# Patient Record
Sex: FEMALE | Race: BLACK OR AFRICAN AMERICAN | NOT HISPANIC OR LATINO | Employment: FULL TIME | URBAN - METROPOLITAN AREA
[De-identification: names, ages, dates, MRNs, and addresses within clinical notes are randomized per-mention and may not be internally consistent; named-entity substitution may affect disease eponyms.]

---

## 2022-11-29 ENCOUNTER — OFFICE VISIT (OUTPATIENT)
Dept: FAMILY MEDICINE CLINIC | Facility: CLINIC | Age: 22
End: 2022-11-29

## 2022-11-29 VITALS
BODY MASS INDEX: 28.7 KG/M2 | HEART RATE: 75 BPM | WEIGHT: 162 LBS | HEIGHT: 63 IN | DIASTOLIC BLOOD PRESSURE: 88 MMHG | OXYGEN SATURATION: 100 % | TEMPERATURE: 98.2 F | SYSTOLIC BLOOD PRESSURE: 125 MMHG

## 2022-11-29 DIAGNOSIS — Z30.41 ENCOUNTER FOR BIRTH CONTROL PILLS MAINTENANCE: ICD-10-CM

## 2022-11-29 DIAGNOSIS — J45.20 MILD INTERMITTENT ASTHMA WITHOUT COMPLICATION: ICD-10-CM

## 2022-11-29 DIAGNOSIS — F41.9 ANXIETY: Primary | ICD-10-CM

## 2022-11-29 PROCEDURE — 99204 OFFICE O/P NEW MOD 45 MIN: CPT

## 2022-11-29 RX ORDER — BUSPIRONE HYDROCHLORIDE 7.5 MG/1
7.5 TABLET ORAL 3 TIMES DAILY
Qty: 90 TABLET | Refills: 5 | Status: SHIPPED | OUTPATIENT
Start: 2022-11-29 | End: 2023-01-10 | Stop reason: ALTCHOICE

## 2022-11-29 RX ORDER — NORGESTIMATE AND ETHINYL ESTRADIOL 7DAYSX3 28
1 KIT ORAL DAILY
Qty: 28 TABLET | Refills: 12 | Status: CANCELLED | OUTPATIENT
Start: 2022-11-29 | End: 2023-11-29

## 2022-11-29 RX ORDER — LEVONORGESTREL AND ETHINYL ESTRADIOL 0.1-0.02MG
1 KIT ORAL DAILY
Qty: 28 TABLET | Refills: 12 | Status: SHIPPED | OUTPATIENT
Start: 2022-11-29 | End: 2023-11-29

## 2022-11-29 NOTE — PROGRESS NOTES
Chief Complaint  Contraception, Asthma, and Anxiety    Subjective          History of Present Illness    Nazia Moore 22 y.o. female who presents today for a new patient appointment.  She has a history of:   Patient Active Problem List   Diagnosis   • Anxiety   • Asthma   She is here to establish care and is a new patient to me I reviewed the PFSH recorded today by my MA/LPN staff.   She has been feeling well.     She has a history of asthma that is well controlled.  She uses her Albuterol inhaler as needed.  No current or recent exacerbations.  She is not symptomatic.    She has anxiety without depression.  She has never taken anxiety medication, but she wants to start medication today.  She does not see a therapist.  Evaluation to date: none.  Treatment to date: none.  She denies depression, suicidal thoughts, suicidal plan, and self-harm.    The patient takes levonorgestrel-ethinyl estradiol (Vienva) 0.1-20 MG-MCG daily for birth control.  The patient tolerates the medication well without side effects.  She is requesting a refill today.  She is not a smoker.  No history of DVT, and no signs/symptoms of DVT.  Medication reviewed.  Will refill medication.    She  denies medication side effects.    Review of Systems   Constitutional: Negative for chills, fatigue and fever.   HENT: Negative for congestion and sinus pressure.    Eyes: Negative for visual disturbance.   Respiratory: Negative for apnea, cough, chest tightness, shortness of breath and wheezing.    Cardiovascular: Negative for chest pain and palpitations.   Gastrointestinal: Negative for abdominal pain, constipation, diarrhea, nausea and vomiting.   Genitourinary: Negative for dysuria, frequency and urgency.   Musculoskeletal: Negative for back pain.   Skin: Negative for rash.   Neurological: Negative for dizziness, syncope and light-headedness.   Psychiatric/Behavioral: Negative for agitation, behavioral problems, decreased concentration, dysphoric  "mood, self-injury, sleep disturbance and suicidal ideas. The patient is nervous/anxious. The patient is not hyperactive.         Objective   Vital Signs:   /88   Pulse 75   Temp 98.2 °F (36.8 °C)   Ht 160 cm (63\")   Wt 73.5 kg (162 lb)   SpO2 100%   BMI 28.70 kg/m²      BMI is >= 25 and <30. (Overweight) The following options were offered after discussion;: exercise counseling/recommendations and nutrition counseling/recommendations        Physical Exam  Vitals and nursing note reviewed.   Constitutional:       Appearance: Normal appearance. She is well-developed and overweight. She is not diaphoretic.   HENT:      Head: Normocephalic and atraumatic.      Right Ear: External ear normal.      Left Ear: External ear normal.   Eyes:      Conjunctiva/sclera: Conjunctivae normal.      Pupils: Pupils are equal, round, and reactive to light.   Neck:      Vascular: No carotid bruit.   Cardiovascular:      Rate and Rhythm: Normal rate and regular rhythm.      Pulses: Normal pulses.      Heart sounds: Normal heart sounds. No murmur heard.    No gallop.   Pulmonary:      Effort: Pulmonary effort is normal. No respiratory distress.   Musculoskeletal:         General: Normal range of motion.      Cervical back: Normal range of motion and neck supple.      Right lower leg: No edema.      Left lower leg: No edema.   Skin:     General: Skin is warm and dry.      Coloration: Skin is not jaundiced.   Neurological:      General: No focal deficit present.      Mental Status: She is alert and oriented to person, place, and time.      Motor: No weakness or abnormal muscle tone.      Coordination: Coordination normal.      Gait: Gait normal.   Psychiatric:         Mood and Affect: Mood is not anxious or depressed. Affect is not tearful.         Speech: Speech normal.         Behavior: Behavior normal. Behavior is cooperative.         Thought Content: Thought content normal. Thought content does not include suicidal ideation. " Thought content does not include suicidal plan.         Cognition and Memory: Cognition normal.         Judgment: Judgment normal.                         Assessment and Plan      Diagnoses and all orders for this visit:    1. Anxiety (Primary)  Comments:  New patient  Start Buspirone as directed  Work on relaxation techniques, consider therapy  Follow-up in 6 weeks  Orders:  -     busPIRone (BUSPAR) 7.5 MG tablet; Take 1 tablet by mouth 3 (Three) Times a Day.  Dispense: 90 tablet; Refill: 5  -     Comprehensive metabolic panel  -     Lipid panel  -     CBC and Differential  -     TSH    2. Encounter for birth control pills maintenance  Comments:  Continue oral contraceptive pills as directed  Non-smoker, no DVT history  Follow-up in 6 weeks  Orders:  -     levonorgestrel-ethinyl estradiol (Vienva) 0.1-20 MG-MCG per tablet; Take 1 tablet by mouth Daily.  Dispense: 28 tablet; Refill: 12    3. Mild intermittent asthma without complication  Comments:  Well-controlled on Albuterol inhaler as needed  Asymptomatic   Continue Albuterol inhaler as needed  No refills needed              Follow Up     Return in about 6 weeks (around 1/10/2023) for Next scheduled follow up.    Patient was given instructions and counseling regarding her condition or for health maintenance advice. Please see specific information pulled into the AVS if appropriate.     -Follow-up in 6 weeks.

## 2022-12-15 ENCOUNTER — OFFICE VISIT (OUTPATIENT)
Dept: FAMILY MEDICINE CLINIC | Facility: CLINIC | Age: 22
End: 2022-12-15

## 2022-12-15 VITALS
BODY MASS INDEX: 31.01 KG/M2 | HEIGHT: 63 IN | DIASTOLIC BLOOD PRESSURE: 72 MMHG | OXYGEN SATURATION: 99 % | WEIGHT: 175 LBS | SYSTOLIC BLOOD PRESSURE: 117 MMHG | HEART RATE: 77 BPM | RESPIRATION RATE: 16 BRPM | TEMPERATURE: 98.2 F

## 2022-12-15 DIAGNOSIS — K90.49 FOOD INTOLERANCE: ICD-10-CM

## 2022-12-15 DIAGNOSIS — R11.10 VOMITING AND DIARRHEA: Primary | ICD-10-CM

## 2022-12-15 DIAGNOSIS — R19.7 VOMITING AND DIARRHEA: Primary | ICD-10-CM

## 2022-12-15 PROCEDURE — 99213 OFFICE O/P EST LOW 20 MIN: CPT

## 2022-12-15 RX ORDER — ALBUTEROL SULFATE 90 UG/1
AEROSOL, METERED RESPIRATORY (INHALATION)
COMMUNITY
Start: 2022-12-14

## 2022-12-15 NOTE — PROGRESS NOTES
"Chief Complaint  Diarrhea and Vomiting (Patient states that symptoms started 12/14/2022)    Subjective          History of Present Illness    Nazia Moore 22 y.o. female presents reporting vomiting and diarrhea.  Symptom onset was yesterday.  Symptoms are related to eating peanut butter and sushi.  She ate peanut butter last night and sushi yesterday, and symptoms started after eating the sushi.  She had problems eating sushi and peanut butter in the past.  She had 4 episodes of diarrhea in the past 24 hours.  First day of LMP was 11/24/2022. Evaluation to date: none.  Treatment to date: ginger ale.  She reports possible pregnancy.  She reports she is starting to feel better.  She denies abdominal pain, fever, chills, rectal pain, rectal pressure, nausea, blood in stool, dizziness, lightheadedness, and weakness.    Review of Systems   Constitutional: Negative for chills, fatigue and fever.   HENT: Negative for congestion and sinus pressure.    Eyes: Negative for visual disturbance.   Respiratory: Negative for cough, chest tightness and shortness of breath.    Cardiovascular: Negative for chest pain and palpitations.   Gastrointestinal: Positive for diarrhea and vomiting. Negative for abdominal distention, abdominal pain, anal bleeding, blood in stool, constipation, nausea and rectal pain.   Genitourinary: Negative for dysuria, frequency and urgency.   Musculoskeletal: Negative for back pain.   Skin: Negative for rash.   Neurological: Negative for dizziness, syncope, weakness and light-headedness.   Psychiatric/Behavioral: Negative for behavioral problems and sleep disturbance.        Objective   Vital Signs:   /72   Pulse 77   Temp 98.2 °F (36.8 °C) (Oral)   Resp 16   Ht 160 cm (63\")   Wt 79.4 kg (175 lb)   SpO2 99%   BMI 31.00 kg/m²        Physical Exam  Vitals and nursing note reviewed.   Constitutional:       General: She is not in acute distress.     Appearance: Normal appearance. She is " well-developed. She is not diaphoretic.   HENT:      Head: Normocephalic and atraumatic.   Eyes:      General: No scleral icterus.     Conjunctiva/sclera: Conjunctivae normal.      Pupils: Pupils are equal, round, and reactive to light.   Cardiovascular:      Rate and Rhythm: Normal rate and regular rhythm.      Pulses: Normal pulses.      Heart sounds: Normal heart sounds. No murmur heard.    No gallop.   Pulmonary:      Effort: Pulmonary effort is normal. No respiratory distress.      Breath sounds: No wheezing or rales.   Chest:      Chest wall: No tenderness.   Abdominal:      General: Bowel sounds are normal. There is no distension or abdominal bruit.      Palpations: Abdomen is soft. Abdomen is not rigid. There is no hepatomegaly, splenomegaly or mass.      Tenderness: There is no abdominal tenderness. There is no guarding or rebound. Negative signs include Snell's sign and McBurney's sign.      Comments: No abdominal tenderness with palpation.  Abdomen palpates soft.  Bowel sounds are active in all four quadrants.  No guarding or rebound tenderness.   Musculoskeletal:         General: No deformity. Normal range of motion.      Cervical back: Normal range of motion and neck supple.   Skin:     General: Skin is warm and dry.      Findings: No rash.   Neurological:      Mental Status: She is alert and oriented to person, place, and time.   Psychiatric:         Behavior: Behavior normal.         Thought Content: Thought content normal.         Judgment: Judgment normal.                         Assessment and Plan      Diagnoses and all orders for this visit:    1. Vomiting and diarrhea (Primary)  Comments:  Improving  Avoid peanut butter and sushi  Clear liquids, hydrate well  Return if no improvement    2. Food intolerance  Comments:  Improving  Avoid peanut butter and sushi  Clear liquids, hydrate well  Return if no improvement            Follow Up     Return if symptoms worsen or fail to improve.    Patient  was given instructions and counseling regarding her condition or for health maintenance advice. Please see specific information pulled into the AVS if appropriate.     -Clear liquid diet and bowel rest for 24 hours, then bland diet for 24 hours, then slowly introduce foods back into diet.  -Start taking a probiotic daily.  -Drink plenty of fluids and get plenty of rest.  -Report to the ED for abdominal pain, fever, chills, vomiting, stools that are bloody/black/tarry, inability to have a bowel movement, or any other worsening signs or symptoms.  -Return if symptoms worsen or do not improve.

## 2023-01-10 ENCOUNTER — OFFICE VISIT (OUTPATIENT)
Dept: FAMILY MEDICINE CLINIC | Facility: CLINIC | Age: 23
End: 2023-01-10
Payer: COMMERCIAL

## 2023-01-10 VITALS
WEIGHT: 180 LBS | OXYGEN SATURATION: 98 % | DIASTOLIC BLOOD PRESSURE: 88 MMHG | HEART RATE: 86 BPM | BODY MASS INDEX: 31.89 KG/M2 | TEMPERATURE: 98.2 F | HEIGHT: 63 IN | SYSTOLIC BLOOD PRESSURE: 122 MMHG

## 2023-01-10 DIAGNOSIS — R45.86 MOOD CHANGES: ICD-10-CM

## 2023-01-10 DIAGNOSIS — F41.9 ANXIETY: Primary | ICD-10-CM

## 2023-01-10 DIAGNOSIS — F32.A DEPRESSION, UNSPECIFIED DEPRESSION TYPE: ICD-10-CM

## 2023-01-10 PROCEDURE — 99214 OFFICE O/P EST MOD 30 MIN: CPT

## 2023-01-10 RX ORDER — BUSPIRONE HYDROCHLORIDE 7.5 MG/1
7.5 TABLET ORAL 3 TIMES DAILY
Qty: 90 TABLET | Refills: 5 | Status: CANCELLED | OUTPATIENT
Start: 2023-01-10

## 2023-01-10 NOTE — PROGRESS NOTES
Chief Complaint  Anxiety    Subjective          History of Present Illness    Nazia Moore 22 y.o. female presents for a 6-week follow-up on anxiety.  The patient was last seen on 11/29/2022.  Buspirone 7.5 mg three times daily was started at that appointment.  The patient was encouraged to take medication as directed, work on relaxation techniques, consider therapy, and return to the office in 6 weeks for close follow-up.    Today, the patient reports anxiety has improved since starting Buspirone, but she is still having some symptoms.  She also reports depressed feelings, mood fluctuations, and \"highs and lows.\"  Symptoms onset was two months ago.  She denied depression at her last appointment.  She is not currently in therapy.  She reports a possible family history of bipolar with her father, but he has never been diagnosed.  She denies suicidal thoughts, suicidal plan, and self-harm.    PHQ-9 score: 7    Review of Systems   Constitutional: Negative for chills, fatigue and fever.   HENT: Negative for congestion and sinus pressure.    Eyes: Negative for visual disturbance.   Respiratory: Negative for apnea, cough, chest tightness, shortness of breath and wheezing.    Cardiovascular: Negative for chest pain and palpitations.   Gastrointestinal: Negative for abdominal pain, constipation, diarrhea, nausea and vomiting.   Genitourinary: Negative for dysuria, frequency and urgency.   Musculoskeletal: Negative for back pain.   Skin: Negative for rash.   Neurological: Negative for dizziness, syncope and light-headedness.   Psychiatric/Behavioral: Positive for dysphoric mood (highs and lows). Negative for agitation, behavioral problems, decreased concentration, self-injury, sleep disturbance and suicidal ideas. The patient is nervous/anxious. The patient is not hyperactive.         Objective   Vital Signs:   /88   Pulse 86   Temp 98.2 °F (36.8 °C)   Ht 160 cm (62.99\")   Wt 81.6 kg (180 lb)   SpO2 98%   BMI  31.89 kg/m²      BMI is >= 30 and <35. (Class 1 Obesity). The following options were offered after discussion;: exercise counseling/recommendations and nutrition counseling/recommendations        Physical Exam  Vitals and nursing note reviewed.   Constitutional:       Appearance: Normal appearance. She is well-developed. She is obese. She is not diaphoretic.   HENT:      Head: Normocephalic and atraumatic.      Right Ear: External ear normal.      Left Ear: External ear normal.   Eyes:      Conjunctiva/sclera: Conjunctivae normal.      Pupils: Pupils are equal, round, and reactive to light.   Neck:      Vascular: No carotid bruit.   Cardiovascular:      Rate and Rhythm: Normal rate and regular rhythm.      Pulses: Normal pulses.      Heart sounds: Normal heart sounds. No murmur heard.    No gallop.   Pulmonary:      Effort: Pulmonary effort is normal. No respiratory distress.   Musculoskeletal:         General: Normal range of motion.      Cervical back: Normal range of motion and neck supple.      Right lower leg: No edema.      Left lower leg: No edema.   Skin:     General: Skin is warm and dry.      Coloration: Skin is not jaundiced.   Neurological:      General: No focal deficit present.      Mental Status: She is alert and oriented to person, place, and time.      Motor: No weakness or abnormal muscle tone.      Coordination: Coordination normal.      Gait: Gait normal.   Psychiatric:         Mood and Affect: Mood is not anxious or depressed. Affect is not tearful.         Speech: Speech normal.         Behavior: Behavior normal. Behavior is cooperative.         Thought Content: Thought content normal. Thought content does not include suicidal ideation. Thought content does not include suicidal plan.         Cognition and Memory: Cognition normal.         Judgment: Judgment normal.                         Assessment and Plan      Diagnoses and all orders for this visit:    1. Anxiety  (Primary)  Comments:  Improved but still having symptoms  Start Sertraline daily  Make appointment for therapy, referral to Psychiatry  Follow-up in 6 weeks  Orders:  -     sertraline (Zoloft) 50 MG tablet; Take 1 tablet by mouth Daily.  Dispense: 30 tablet; Refill: 1  -     Ambulatory Referral to Psychiatry    2. Depression, unspecified depression type  Comments:  New problem  Start Sertraline daily  Make appointment for therapy, referral to Psychiatry  Follow-up in 6 weeks  Orders:  -     sertraline (Zoloft) 50 MG tablet; Take 1 tablet by mouth Daily.  Dispense: 30 tablet; Refill: 1  -     Ambulatory Referral to Psychiatry    3. Mood changes  Comments:  New problem  Start Sertraline daily  Make appointment for therapy, referral to Psychiatry  Follow-up in 6 weeks  Orders:  -     sertraline (Zoloft) 50 MG tablet; Take 1 tablet by mouth Daily.  Dispense: 30 tablet; Refill: 1  -     Ambulatory Referral to Psychiatry            Follow Up     Return in about 6 weeks (around 2/21/2023) for Next scheduled follow up.    Patient was given instructions and counseling regarding her condition or for health maintenance advice. Please see specific information pulled into the AVS if appropriate.     -Bonds for safety were provided today. The patient was encouraged to seek immediate mental health evaluation at [facility] emergency psychiatric services for worsening depression, suicidal thoughts, suicidal plan, or thoughts of self-harm.   -The patient was given a list of local therapists today.   -Close follow-up in 6 weeks.

## 2023-03-01 ENCOUNTER — OFFICE VISIT (OUTPATIENT)
Dept: FAMILY MEDICINE CLINIC | Facility: CLINIC | Age: 23
End: 2023-03-01
Payer: COMMERCIAL

## 2023-03-01 VITALS
HEART RATE: 67 BPM | RESPIRATION RATE: 16 BRPM | SYSTOLIC BLOOD PRESSURE: 110 MMHG | OXYGEN SATURATION: 99 % | WEIGHT: 173.8 LBS | TEMPERATURE: 97.5 F | BODY MASS INDEX: 30.79 KG/M2 | HEIGHT: 63 IN | DIASTOLIC BLOOD PRESSURE: 60 MMHG

## 2023-03-01 DIAGNOSIS — F41.9 ANXIETY: ICD-10-CM

## 2023-03-01 DIAGNOSIS — F32.A DEPRESSION, UNSPECIFIED DEPRESSION TYPE: ICD-10-CM

## 2023-03-01 DIAGNOSIS — R05.8 DRY COUGH: ICD-10-CM

## 2023-03-01 DIAGNOSIS — R45.86 MOOD CHANGES: ICD-10-CM

## 2023-03-01 DIAGNOSIS — J01.40 ACUTE PANSINUSITIS, RECURRENCE NOT SPECIFIED: Primary | ICD-10-CM

## 2023-03-01 PROCEDURE — 99214 OFFICE O/P EST MOD 30 MIN: CPT

## 2023-03-01 RX ORDER — AMOXICILLIN AND CLAVULANATE POTASSIUM 875; 125 MG/1; MG/1
1 TABLET, FILM COATED ORAL EVERY 12 HOURS SCHEDULED
Qty: 14 TABLET | Refills: 0 | Status: SHIPPED | OUTPATIENT
Start: 2023-03-01 | End: 2023-03-08

## 2023-03-01 RX ORDER — BENZONATATE 100 MG/1
100 CAPSULE ORAL 3 TIMES DAILY PRN
Qty: 30 CAPSULE | Refills: 1 | Status: SHIPPED | OUTPATIENT
Start: 2023-03-01

## 2023-03-01 NOTE — PROGRESS NOTES
Chief Complaint  URI and Sinusitis    Subjective          History of Present Illness    Nazia Moore 22 y.o. female presents for evaluation of sinus pressure and congestion. Symptoms include congestion, dry cough, sinus pain and runny nose.  Onset of symptoms was 3 days ago, gradually improving since that time. Patient denies shortness of breath, wheezing, hemoptysis, pleuritic chest pain, fever, dyspnea on exertion, ear drainage, eye discharge, diarrhea, vomiting, vertigo, sneezing, chills, sweats, epistaxis, high fever, joint pain.   Evaluation to date: none Treatment to date:  OTC antihistamines and Advil.     No Covid or flu exposure.    She is requesting a medication refill for Sertraline.  She takes the medication for anxiety and depression.  The medication overall works well to manage symptoms.  She tolerates the medication well with no side effects.  Medication reviewed.  Will refill medication.    Review of Systems   Constitutional: Negative for chills, fatigue and fever.   HENT: Positive for rhinorrhea and sinus pressure. Negative for congestion, ear pain, sore throat and trouble swallowing.    Eyes: Negative for visual disturbance.   Respiratory: Positive for cough. Negative for chest tightness, shortness of breath and wheezing.    Cardiovascular: Negative for chest pain and palpitations.   Gastrointestinal: Negative for abdominal pain, constipation, diarrhea, nausea and vomiting.   Genitourinary: Negative for dysuria, frequency and urgency.   Musculoskeletal: Negative for back pain.   Skin: Negative for rash.   Neurological: Negative for dizziness, syncope and light-headedness.   Psychiatric/Behavioral: Negative for behavioral problems, dysphoric mood, self-injury, sleep disturbance and suicidal ideas. The patient is not nervous/anxious.         Objective   Vital Signs:   /60 (BP Location: Left arm, Patient Position: Sitting, Cuff Size: Adult)   Pulse 67   Temp 97.5 °F (36.4 °C) (Oral)   Resp  "16   Ht 160 cm (62.99\")   Wt 78.8 kg (173 lb 12.8 oz)   SpO2 99%   BMI 30.80 kg/m²        Physical Exam  Vitals and nursing note reviewed.   Constitutional:       General: She is not in acute distress.     Appearance: Normal appearance. She is well-developed. She is not toxic-appearing or diaphoretic.   HENT:      Head: Normocephalic and atraumatic. Hair is normal.      Right Ear: External ear normal. No drainage, swelling or tenderness. Tympanic membrane is not erythematous or retracted.      Left Ear: External ear normal. No drainage, swelling or tenderness. Tympanic membrane is not erythematous or retracted.      Nose: Rhinorrhea present. No mucosal edema or congestion.      Right Sinus: Maxillary sinus tenderness and frontal sinus tenderness present.      Left Sinus: Maxillary sinus tenderness and frontal sinus tenderness present.      Mouth/Throat:      Mouth: Mucous membranes are moist. No oral lesions.      Pharynx: Uvula midline. Posterior oropharyngeal erythema present. No pharyngeal swelling, oropharyngeal exudate or uvula swelling.      Tonsils: No tonsillar exudate or tonsillar abscesses.   Eyes:      General: No scleral icterus.        Right eye: No discharge.         Left eye: No discharge.      Conjunctiva/sclera: Conjunctivae normal.      Pupils: Pupils are equal, round, and reactive to light.   Cardiovascular:      Rate and Rhythm: Normal rate and regular rhythm.      Heart sounds: Normal heart sounds. No murmur heard.    No gallop.   Pulmonary:      Effort: No respiratory distress.      Breath sounds: Normal breath sounds. No stridor or decreased air movement. No decreased breath sounds, wheezing, rhonchi or rales.   Chest:      Chest wall: No tenderness.   Abdominal:      Palpations: Abdomen is soft.      Tenderness: There is no abdominal tenderness.   Musculoskeletal:      Cervical back: Normal range of motion and neck supple.   Lymphadenopathy:      Cervical: No cervical adenopathy. "   Skin:     General: Skin is warm and dry.      Findings: No rash.   Neurological:      Mental Status: She is alert and oriented to person, place, and time.      Motor: No abnormal muscle tone.   Psychiatric:         Mood and Affect: Mood is not anxious or depressed. Affect is not tearful.         Behavior: Behavior normal. Behavior is cooperative.         Thought Content: Thought content normal. Thought content does not include suicidal ideation. Thought content does not include suicidal plan.         Cognition and Memory: Cognition normal.         Judgment: Judgment normal.                         Assessment and Plan      Diagnoses and all orders for this visit:    1. Acute pansinusitis, recurrence not specified (Primary)  -     amoxicillin-clavulanate (Augmentin) 875-125 MG per tablet; Take 1 tablet by mouth Every 12 (Twelve) Hours for 7 days.  Dispense: 14 tablet; Refill: 0  -     benzonatate (Tessalon Perles) 100 MG capsule; Take 1 capsule by mouth 3 (Three) Times a Day As Needed for Cough.  Dispense: 30 capsule; Refill: 1    2. Dry cough  -     amoxicillin-clavulanate (Augmentin) 875-125 MG per tablet; Take 1 tablet by mouth Every 12 (Twelve) Hours for 7 days.  Dispense: 14 tablet; Refill: 0  -     benzonatate (Tessalon Perles) 100 MG capsule; Take 1 capsule by mouth 3 (Three) Times a Day As Needed for Cough.  Dispense: 30 capsule; Refill: 1    3. Anxiety  Comments:  Well controlled on current medication  Continue Sertraline daily  Follow-up in 6 months  Orders:  -     sertraline (Zoloft) 50 MG tablet; Take 1 tablet by mouth Daily.  Dispense: 90 tablet; Refill: 1    4. Depression, unspecified depression type  Comments:  Well controlled on current medication  Continue Sertraline daily  Follow-up in 6 months  Orders:  -     sertraline (Zoloft) 50 MG tablet; Take 1 tablet by mouth Daily.  Dispense: 90 tablet; Refill: 1    5. Mood changes  Comments:  Well controlled on current medication  Continue Sertraline  daily  Follow-up in 6 months  Orders:  -     sertraline (Zoloft) 50 MG tablet; Take 1 tablet by mouth Daily.  Dispense: 90 tablet; Refill: 1            Follow Up     Return if symptoms worsen or fail to improve.    Patient was given instructions and counseling regarding her condition or for health maintenance advice. Please see specific information pulled into the AVS if appropriate.     -Take medication as prescribed.  -She declined Covid testing today.  -Monitor for fever and take Tylenol as needed.  Drink plenty of fluids and get plenty of rest.  -Use cool-mist humidifier as needed.  -Seek immediate medical attention for fever unrelieved by Tylenol, chest pain, shortness of breath, decreased oxygen saturations, sharp pain with breathing, or any other worsening signs or symptoms.  -Return to the office is symptoms worsen or do not improve.

## 2023-10-02 DIAGNOSIS — R45.86 MOOD CHANGES: ICD-10-CM

## 2023-10-02 DIAGNOSIS — F41.9 ANXIETY: ICD-10-CM

## 2023-10-02 DIAGNOSIS — F32.A DEPRESSION, UNSPECIFIED DEPRESSION TYPE: ICD-10-CM

## 2023-10-02 NOTE — TELEPHONE ENCOUNTER
Caller: NOEFROY    Relationship: Mother    Best call back number: [phone]    Requested Prescriptions:   Requested Prescriptions     Pending Prescriptions Disp Refills    sertraline (Zoloft) 50 MG tablet 90 tablet 1     Sig: Take 1 tablet by mouth Daily.        Pharmacy where request should be sent: [id number] - [phone]  - [phone] FX     Last office visit with prescribing clinician: 3/1/2023   Last telemedicine visit with prescribing clinician: Visit date not found   Next office visit with prescribing clinician: Visit date not found     Additional details provided by patient: WILL RUN OUT THIS WEEK    Does the patient have less than a 3 day supply:  [] Yes  [x] No    Would you like a call back once the refill request has been completed: [] Yes [x] No    If the office needs to give you a call back, can they leave a voicemail: [] Yes [x] No    Jose Angel Vieira   10/02/23 12:22 EDT       ”

## 2023-10-02 NOTE — TELEPHONE ENCOUNTER
Caller: Nazia Moore    Relationship: Self    Best call back number: [phone]     Requested Prescriptions:   Requested Prescriptions     Pending Prescriptions Disp Refills    sertraline (Zoloft) 50 MG tablet 90 tablet 1     Sig: Take 1 tablet by mouth Daily.        Pharmacy where request should be sent: [id number] [place], [place] - [address] [facility] [address] [facility] [phone] [facility] [phone]      Last office visit with prescribing clinician: 3/1/2023   Last telemedicine visit with prescribing clinician: Visit date not found   Next office visit with prescribing clinician: Visit date not found     Additional details provided by patient: PATIENT WILL BE OUT BY END OF WEEK    Lawrence Butler Rep   10/02/23 12:13 EDT

## 2023-11-27 DIAGNOSIS — Z30.41 ENCOUNTER FOR BIRTH CONTROL PILLS MAINTENANCE: ICD-10-CM

## 2023-11-28 RX ORDER — LEVONORGESTREL AND ETHINYL ESTRADIOL 0.1-0.02MG
1 KIT ORAL DAILY
Qty: 28 TABLET | Refills: 12 | Status: SHIPPED | OUTPATIENT
Start: 2023-11-28

## 2024-03-31 DIAGNOSIS — F41.9 ANXIETY: ICD-10-CM

## 2024-03-31 DIAGNOSIS — F32.A DEPRESSION, UNSPECIFIED DEPRESSION TYPE: ICD-10-CM

## 2024-03-31 DIAGNOSIS — R45.86 MOOD CHANGES: ICD-10-CM

## 2024-05-10 DIAGNOSIS — F41.9 ANXIETY: ICD-10-CM

## 2024-05-10 DIAGNOSIS — F32.A DEPRESSION, UNSPECIFIED DEPRESSION TYPE: ICD-10-CM

## 2024-05-10 DIAGNOSIS — R45.86 MOOD CHANGES: ICD-10-CM

## 2024-05-13 ENCOUNTER — TELEPHONE (OUTPATIENT)
Dept: FAMILY MEDICINE CLINIC | Facility: CLINIC | Age: 24
End: 2024-05-13
Payer: COMMERCIAL

## 2024-05-16 NOTE — TELEPHONE ENCOUNTER
let pt know bill sent in a 30 day refill, but she needs to be seen before more refills are sent in. To call us and sched.

## 2024-05-21 ENCOUNTER — TELEPHONE (OUTPATIENT)
Dept: FAMILY MEDICINE CLINIC | Facility: CLINIC | Age: 24
End: 2024-05-21

## 2024-05-21 ENCOUNTER — OFFICE VISIT (OUTPATIENT)
Dept: FAMILY MEDICINE CLINIC | Facility: CLINIC | Age: 24
End: 2024-05-21
Payer: COMMERCIAL

## 2024-05-21 VITALS
DIASTOLIC BLOOD PRESSURE: 84 MMHG | BODY MASS INDEX: 37.53 KG/M2 | HEIGHT: 63 IN | TEMPERATURE: 98 F | OXYGEN SATURATION: 98 % | WEIGHT: 211.8 LBS | SYSTOLIC BLOOD PRESSURE: 115 MMHG | HEART RATE: 86 BPM

## 2024-05-21 DIAGNOSIS — F32.A DEPRESSION, UNSPECIFIED DEPRESSION TYPE: ICD-10-CM

## 2024-05-21 DIAGNOSIS — Z30.41 ENCOUNTER FOR BIRTH CONTROL PILLS MAINTENANCE: ICD-10-CM

## 2024-05-21 DIAGNOSIS — Z31.69 PRE-CONCEPTION COUNSELING: ICD-10-CM

## 2024-05-21 DIAGNOSIS — F41.9 ANXIETY: Primary | ICD-10-CM

## 2024-05-21 PROCEDURE — 99214 OFFICE O/P EST MOD 30 MIN: CPT

## 2024-05-21 RX ORDER — LEVONORGESTREL/ETHIN.ESTRADIOL 0.1-0.02MG
1 TABLET ORAL DAILY
Qty: 28 TABLET | Refills: 12 | Status: SHIPPED | OUTPATIENT
Start: 2024-05-21

## 2024-05-21 RX ORDER — AMOXICILLIN 875 MG/1
875 TABLET, COATED ORAL 2 TIMES DAILY
COMMUNITY
Start: 2024-01-26

## 2024-05-21 NOTE — ASSESSMENT & PLAN NOTE
Patient's depression is a single episode that is mild without psychosis. Depression is in full remission and improving with lifestyle modifications.    Plan:   Medication/s discontinued today; per patient request. Depression is in full remission and she is aware to inform me immediately if depression returns. No SI or HI.   Weaning instructions given today-the patient verbalized understanding.  Follow up in 6 months.   Weaning instructions given.

## 2024-05-21 NOTE — PROGRESS NOTES
"Chief Complaint  Med Refill (Anxiety and birth control.)    Subjective          History of Present Illness    Nazia Moore female 23 y.o., presents today for follow up of Depression and Anxiety.  She reports lifestyle changes have been made, anxiety and depression have improved considerably, and she would like to wean off Sertraline.  She denies current suicidal and homicidal ideation. Risk factors are none.  Previous treatment includes medication (SSRI).  She complains of the following medication side effects:none. The patient has previously been in counseling and doesn't feel like counseling is necessary.    She is wanting to discontinue oral birth contraception due to plans to become pregnant. She started a prenatal vitamin with folic acid two weeks ago.  She is not established with OB/GYN.  We discussed the need for preconceptual counseling.  She is in agreement for referral to OB/GYN.        Review of Systems   Constitutional:  Negative for chills, fatigue and fever.   HENT:  Negative for congestion and sinus pressure.    Eyes:  Negative for visual disturbance.   Respiratory:  Negative for cough, chest tightness and shortness of breath.    Cardiovascular:  Negative for chest pain and palpitations.   Gastrointestinal:  Negative for abdominal pain, constipation, diarrhea, nausea and vomiting.   Genitourinary:  Negative for dysuria, frequency and urgency.   Musculoskeletal:  Negative for back pain.   Skin:  Negative for rash.   Neurological:  Negative for dizziness, syncope and light-headedness.   Psychiatric/Behavioral:  Negative for behavioral problems, dysphoric mood, self-injury, sleep disturbance and suicidal ideas. The patient is not hyperactive.         Objective   Vital Signs:   /84   Pulse 86   Temp 98 °F (36.7 °C) (Temporal)   Ht 160 cm (63\")   Wt 96.1 kg (211 lb 12.8 oz)   SpO2 98%   BMI 37.52 kg/m²      Class 2 Severe Obesity (BMI >=35 and <=39.9). Obesity-related health conditions include " the following: none. Obesity is newly identified. BMI is is above average; BMI management plan is completed. We discussed portion control and increasing exercise.       Physical Exam  Vitals and nursing note reviewed.   Constitutional:       Appearance: Normal appearance. She is well-developed. She is obese. She is not diaphoretic.   HENT:      Head: Normocephalic and atraumatic.   Eyes:      Conjunctiva/sclera: Conjunctivae normal.      Pupils: Pupils are equal, round, and reactive to light.   Neck:      Vascular: No carotid bruit.   Cardiovascular:      Rate and Rhythm: Normal rate and regular rhythm.      Pulses: Normal pulses.      Heart sounds: Normal heart sounds. No murmur heard.     No gallop.   Pulmonary:      Effort: Pulmonary effort is normal. No respiratory distress.      Breath sounds: Normal breath sounds.   Musculoskeletal:         General: Normal range of motion.      Cervical back: Normal range of motion and neck supple.      Right lower leg: No edema.      Left lower leg: No edema.   Skin:     General: Skin is warm and dry.      Coloration: Skin is not jaundiced.   Neurological:      General: No focal deficit present.      Mental Status: She is alert and oriented to person, place, and time.      Motor: No weakness or abnormal muscle tone.      Coordination: Coordination normal.      Gait: Gait normal.   Psychiatric:         Mood and Affect: Mood is not anxious or depressed. Affect is not tearful.         Speech: Speech normal.         Behavior: Behavior normal. Behavior is cooperative.         Thought Content: Thought content normal. Thought content does not include homicidal or suicidal ideation. Thought content does not include homicidal or suicidal plan.         Cognition and Memory: Cognition normal.         Judgment: Judgment normal.                         Assessment and Plan      Assessment & Plan  Anxiety    Depression, unspecified depression type  Patient's depression is a single episode  that is mild without psychosis. Depression is in full remission and improving with lifestyle modifications.    Plan:   Medication/s discontinued today; per patient request. Depression is in full remission and she is aware to inform me immediately if depression returns. No SI or HI.   Weaning instructions given today-the patient verbalized understanding.  Follow up in 6 months.   Weaning instructions given.  Encounter for birth control pills maintenance  The patient will stop OCP therapy after she completes current pack  Continue prenatal vitamin with folic acid   Referral to OBGYN for preconceptual counseling  Pre-conception counseling  The patient will stop OCP therapy after she completes current pack  Continue prenatal vitamin with folic acid   Referral to OBGYN for preconceptual counseling    Orders Placed This Encounter   Procedures    Chlamydia trachomatis, Neisseria gonorrhoeae, Trichomonas vaginalis, PCR - Urine, Urine, Clean Catch    Comprehensive metabolic panel    Lipid panel    TSH    Hepatitis C Antibody    Ambulatory Referral to Obstetrics / Gynecology    CBC and Differential                 Follow Up     Return in about 6 months (around 11/21/2024) for Next scheduled follow up.    Patient was given instructions and counseling regarding her condition or for health maintenance advice. Please see specific information pulled into the AVS if appropriate.

## 2024-06-08 LAB
ALBUMIN SERPL-MCNC: 4.1 G/DL (ref 3.5–5.2)
ALBUMIN/GLOB SERPL: 1.3 G/DL
ALP SERPL-CCNC: 100 U/L (ref 39–117)
ALT SERPL-CCNC: 23 U/L (ref 1–33)
AST SERPL-CCNC: 22 U/L (ref 1–32)
BASOPHILS # BLD AUTO: 0.04 10*3/MM3 (ref 0–0.2)
BASOPHILS NFR BLD AUTO: 0.5 % (ref 0–1.5)
BILIRUB SERPL-MCNC: 0.3 MG/DL (ref 0–1.2)
BUN SERPL-MCNC: 10 MG/DL (ref 6–20)
BUN/CREAT SERPL: 13.9 (ref 7–25)
C TRACH RRNA SPEC QL NAA+PROBE: NEGATIVE
CALCIUM SERPL-MCNC: 9.6 MG/DL (ref 8.6–10.5)
CHLORIDE SERPL-SCNC: 103 MMOL/L (ref 98–107)
CHOLEST SERPL-MCNC: 158 MG/DL (ref 0–200)
CO2 SERPL-SCNC: 23.7 MMOL/L (ref 22–29)
CREAT SERPL-MCNC: 0.72 MG/DL (ref 0.57–1)
EGFRCR SERPLBLD CKD-EPI 2021: 121.4 ML/MIN/1.73
EOSINOPHIL # BLD AUTO: 0.28 10*3/MM3 (ref 0–0.4)
EOSINOPHIL NFR BLD AUTO: 3.7 % (ref 0.3–6.2)
ERYTHROCYTE [DISTWIDTH] IN BLOOD BY AUTOMATED COUNT: 13.4 % (ref 12.3–15.4)
GLOBULIN SER CALC-MCNC: 3.2 GM/DL
GLUCOSE SERPL-MCNC: 101 MG/DL (ref 65–99)
HCT VFR BLD AUTO: 42.1 % (ref 34–46.6)
HCV IGG SERPL QL IA: NON REACTIVE
HDLC SERPL-MCNC: 45 MG/DL (ref 40–60)
HGB BLD-MCNC: 13.2 G/DL (ref 12–15.9)
IMM GRANULOCYTES # BLD AUTO: 0.01 10*3/MM3 (ref 0–0.05)
IMM GRANULOCYTES NFR BLD AUTO: 0.1 % (ref 0–0.5)
LDLC SERPL CALC-MCNC: 99 MG/DL (ref 0–100)
LYMPHOCYTES # BLD AUTO: 2.71 10*3/MM3 (ref 0.7–3.1)
LYMPHOCYTES NFR BLD AUTO: 35.9 % (ref 19.6–45.3)
MCH RBC QN AUTO: 26.7 PG (ref 26.6–33)
MCHC RBC AUTO-ENTMCNC: 31.4 G/DL (ref 31.5–35.7)
MCV RBC AUTO: 85.2 FL (ref 79–97)
MONOCYTES # BLD AUTO: 0.64 10*3/MM3 (ref 0.1–0.9)
MONOCYTES NFR BLD AUTO: 8.5 % (ref 5–12)
N GONORRHOEA RRNA SPEC QL NAA+PROBE: NEGATIVE
NEUTROPHILS # BLD AUTO: 3.87 10*3/MM3 (ref 1.7–7)
NEUTROPHILS NFR BLD AUTO: 51.3 % (ref 42.7–76)
NRBC BLD AUTO-RTO: 0 /100 WBC (ref 0–0.2)
PLATELET # BLD AUTO: 457 10*3/MM3 (ref 140–450)
POTASSIUM SERPL-SCNC: 4.5 MMOL/L (ref 3.5–5.2)
PROT SERPL-MCNC: 7.3 G/DL (ref 6–8.5)
RBC # BLD AUTO: 4.94 10*6/MM3 (ref 3.77–5.28)
SODIUM SERPL-SCNC: 137 MMOL/L (ref 136–145)
T VAGINALIS RRNA SPEC QL NAA+PROBE: NEGATIVE
TRIGL SERPL-MCNC: 70 MG/DL (ref 0–150)
TSH SERPL DL<=0.005 MIU/L-ACNC: 0.84 UIU/ML (ref 0.27–4.2)
VLDLC SERPL CALC-MCNC: 14 MG/DL (ref 5–40)
WBC # BLD AUTO: 7.55 10*3/MM3 (ref 3.4–10.8)

## 2024-06-12 LAB
HBA1C MFR BLD: 5.6 % (ref 4.8–5.6)
WRITTEN AUTHORIZATION: NORMAL

## 2024-06-17 DIAGNOSIS — R79.89 ELEVATED PLATELET COUNT: Primary | ICD-10-CM

## 2024-07-24 DIAGNOSIS — R45.86 MOOD CHANGES: ICD-10-CM

## 2024-07-24 DIAGNOSIS — F41.9 ANXIETY: ICD-10-CM

## 2024-07-24 DIAGNOSIS — F32.A DEPRESSION, UNSPECIFIED DEPRESSION TYPE: ICD-10-CM

## 2025-02-28 ENCOUNTER — TELEPHONE (OUTPATIENT)
Dept: FAMILY MEDICINE CLINIC | Facility: CLINIC | Age: 25
End: 2025-02-28
Payer: COMMERCIAL

## 2025-02-28 NOTE — TELEPHONE ENCOUNTER
Caller: FROY LIU    Relationship: Mother    Best call back number:[phone]     What was the call regarding: PATIENT'S MOM CALLED TODAY FOR APPT. FIRST AVAILABLE WAS TUESDAY 3/4 AND HUB SCHEDULED APPT. MOM WANTS TO KNOW IF PROVIDER CAN REACH OUT TO PATIENT BECAUSE SHE HAS BEEN HAVING MORE DEPRESSIVE EPISODES BUT WITH NO SUICIDAL IDEATION     PLEASE CALL AND ADVISE

## 2025-03-02 DIAGNOSIS — R45.86 MOOD CHANGES: ICD-10-CM

## 2025-03-02 DIAGNOSIS — F32.A DEPRESSION, UNSPECIFIED DEPRESSION TYPE: ICD-10-CM

## 2025-03-02 DIAGNOSIS — F41.9 ANXIETY: ICD-10-CM

## 2025-03-03 NOTE — TELEPHONE ENCOUNTER
Rx Refill Note  Requested Prescriptions     Pending Prescriptions Disp Refills    sertraline (ZOLOFT) 50 MG tablet [Pharmacy Med Name: Sertraline HCl Oral Tablet 50 MG] 90 tablet 0     Sig: TAKE 1 TABLET BY MOUTH EVERY DAY      Last office visit with prescribing clinician: 5/21/2024   Last telemedicine visit with prescribing clinician: Visit date not found   Next office visit with prescribing clinician: 3/4/2025                         Would you like a call back once the refill request has been completed: [] Yes [] No    If the office needs to give you a call back, can they leave a voicemail: [] Yes [] No    Nori Quinones MA  03/03/25, 10:27 EST

## 2025-03-04 ENCOUNTER — OFFICE VISIT (OUTPATIENT)
Dept: FAMILY MEDICINE CLINIC | Facility: CLINIC | Age: 25
End: 2025-03-04
Payer: COMMERCIAL

## 2025-03-04 VITALS
HEART RATE: 75 BPM | HEIGHT: 63 IN | OXYGEN SATURATION: 97 % | BODY MASS INDEX: 37.39 KG/M2 | WEIGHT: 211 LBS | SYSTOLIC BLOOD PRESSURE: 94 MMHG | DIASTOLIC BLOOD PRESSURE: 64 MMHG | TEMPERATURE: 98.7 F

## 2025-03-04 DIAGNOSIS — F32.A DEPRESSION, UNSPECIFIED DEPRESSION TYPE: ICD-10-CM

## 2025-03-04 DIAGNOSIS — F41.9 ANXIETY: Primary | ICD-10-CM

## 2025-03-04 PROCEDURE — 99213 OFFICE O/P EST LOW 20 MIN: CPT

## 2025-03-04 RX ORDER — SERTRALINE HYDROCHLORIDE 100 MG/1
100 TABLET, FILM COATED ORAL DAILY
Qty: 60 TABLET | Refills: 0 | Status: SHIPPED | OUTPATIENT
Start: 2025-03-04

## 2025-03-04 NOTE — PROGRESS NOTES
"Chief Complaint  Anxiety and Depression    Subjective          Anxiety   Symptoms include nervous/anxious behavior and suicidal ideas.  Patient reports no chest pain, confusion, dizziness, nausea or palpitations. Her past medical history is significant for depression.   DepressionSymptoms include nervousness/anxiety and suicidal ideas. Patient is not experiencing: confusion, palpitations, chest pain, dizziness and nausea.  Her past medical history is significant for depression.          Nazia Moore 24 y.o. female presents today for follow up of Depression and Anxiety.  She reports Sertraline medication has helped, and she would like to increase the dose. She has occasional suicidal thoughts, but no suicidal plan. No current suicidal thoughts. She reports having a strong support system that she can reach out to at all times. She state she is trying hard to improve her mental health and wants to do better. She denies homicidal ideation. Risk factors are lifestyle of multiple roles. Current treatment includes medication (SSRI). The patient has had no previous counseling and agrees to start counseling.    PHQ-9 Total Score:  0    After a discussion with the patient today, she would like to increase Sertraline to 100 mg daily. She is stable at this time, and denies current suicidal thoughts and suicidal plan.      Review of Systems   Constitutional:  Negative for fever.   Cardiovascular:  Negative for chest pain and palpitations.   Gastrointestinal:  Negative for nausea and vomiting.   Neurological:  Negative for dizziness, light-headedness and confusion.   Psychiatric/Behavioral:  Positive for dysphoric mood and suicidal ideas. Negative for behavioral problems and self-injury. The patient is nervous/anxious.         Objective   Vital Signs:   BP 94/64 (BP Location: Left arm, Patient Position: Sitting, Cuff Size: Large Adult)   Pulse 75   Temp 98.7 °F (37.1 °C) (Oral)   Ht 160 cm (63\")   Wt 95.7 kg (211 lb)   SpO2 " 97%   BMI 37.38 kg/m²      Class 2 Severe Obesity (BMI >=35 and <=39.9). Obesity-related health conditions include the following: none. Obesity is unchanged. BMI is is above average; BMI management plan is completed. We discussed portion control and increasing exercise.       Physical Exam  Vitals and nursing note reviewed.   Constitutional:       General: She is not in acute distress.     Appearance: Normal appearance. She is well-developed. She is obese.   HENT:      Head: Normocephalic and atraumatic.   Eyes:      Conjunctiva/sclera: Conjunctivae normal.      Pupils: Pupils are equal, round, and reactive to light.   Neck:      Vascular: No carotid bruit.   Cardiovascular:      Rate and Rhythm: Normal rate and regular rhythm.      Pulses: Normal pulses.      Heart sounds: Normal heart sounds.   Pulmonary:      Effort: Pulmonary effort is normal. No respiratory distress.      Breath sounds: Normal breath sounds.   Musculoskeletal:      Cervical back: Normal range of motion and neck supple.   Skin:     General: Skin is warm and dry.      Coloration: Skin is not jaundiced.   Neurological:      General: No focal deficit present.      Mental Status: She is alert and oriented to person, place, and time.      Motor: No abnormal muscle tone.   Psychiatric:         Mood and Affect: Mood is not anxious or depressed. Affect is not tearful.         Speech: Speech normal.         Behavior: Behavior normal. Behavior is cooperative.         Thought Content: Thought content normal. Thought content does not include homicidal or suicidal ideation. Thought content does not include homicidal or suicidal plan.         Cognition and Memory: Cognition normal.         Judgment: Judgment normal.      Comments: The patient has a happy affect and is smiling.                     Results                 Assessment and Plan              Assessment & Plan  Anxiety    Orders:    sertraline (Zoloft) 100 MG tablet; Take 1 tablet by mouth Daily.     Ambulatory Referral to Behavioral Health    Depression, unspecified depression type  Worsened, mostly one day per week when stressed about work.  Was having suicidal thoughts but none currently. No suicidal plan. National suicide hotline reviewed with the patient today.  Increase Sertraline to 100 mg daily.  Referral to behavioral health for talk therapy.  Bonds for safety were provided today. The patient was encouraged to seek immediate mental health evaluation at [facility] emergency services for worsening depression, suicidal thoughts, suicidal plan, or thoughts of self-harm.   The patient was given a list of local therapists today.    Close follow-up in 6 weeks.    Orders:    sertraline (Zoloft) 100 MG tablet; Take 1 tablet by mouth Daily.    Ambulatory Referral to Behavioral Health             Follow Up     Return in about 6 weeks (around 4/15/2025) for Next scheduled follow up.    Patient was given instructions and counseling regarding her condition or for health maintenance advice. Please see specific information pulled into the AVS if appropriate.

## 2025-03-04 NOTE — ASSESSMENT & PLAN NOTE
Orders:    sertraline (Zoloft) 100 MG tablet; Take 1 tablet by mouth Daily.    Ambulatory Referral to Behavioral Health

## 2025-03-04 NOTE — ASSESSMENT & PLAN NOTE
Worsened, mostly one day per week when stressed about work.  Was having suicidal thoughts but none currently. No suicidal plan. National suicide hotline reviewed with the patient today.  Increase Sertraline to 100 mg daily.  Referral to behavioral health for talk therapy.  Bonds for safety were provided today. The patient was encouraged to seek immediate mental health evaluation at [facility] emergency services for worsening depression, suicidal thoughts, suicidal plan, or thoughts of self-harm.   The patient was given a list of local therapists today.    Close follow-up in 6 weeks.    Orders:    sertraline (Zoloft) 100 MG tablet; Take 1 tablet by mouth Daily.    Ambulatory Referral to Behavioral Health

## 2025-03-25 ENCOUNTER — OFFICE VISIT (OUTPATIENT)
Dept: FAMILY MEDICINE CLINIC | Facility: CLINIC | Age: 25
End: 2025-03-25
Payer: COMMERCIAL

## 2025-03-25 ENCOUNTER — TELEPHONE (OUTPATIENT)
Dept: FAMILY MEDICINE CLINIC | Facility: CLINIC | Age: 25
End: 2025-03-25

## 2025-03-25 VITALS
HEART RATE: 84 BPM | SYSTOLIC BLOOD PRESSURE: 110 MMHG | HEIGHT: 63 IN | TEMPERATURE: 98.6 F | BODY MASS INDEX: 36.46 KG/M2 | WEIGHT: 205.8 LBS | DIASTOLIC BLOOD PRESSURE: 72 MMHG | OXYGEN SATURATION: 98 %

## 2025-03-25 DIAGNOSIS — R31.9 URINARY TRACT INFECTION WITH HEMATURIA, SITE UNSPECIFIED: Primary | ICD-10-CM

## 2025-03-25 DIAGNOSIS — R35.0 FREQUENT URINATION: ICD-10-CM

## 2025-03-25 DIAGNOSIS — R30.0 BURNING WITH URINATION: ICD-10-CM

## 2025-03-25 DIAGNOSIS — N89.8 VAGINAL DISCHARGE: ICD-10-CM

## 2025-03-25 DIAGNOSIS — R30.9 PAIN WITH URINATION: ICD-10-CM

## 2025-03-25 DIAGNOSIS — N94.9 GENITAL LESION, FEMALE: ICD-10-CM

## 2025-03-25 DIAGNOSIS — N39.0 URINARY TRACT INFECTION WITH HEMATURIA, SITE UNSPECIFIED: Primary | ICD-10-CM

## 2025-03-25 DIAGNOSIS — N92.6 MISSED MENSES: ICD-10-CM

## 2025-03-25 DIAGNOSIS — R30.0 DYSURIA: ICD-10-CM

## 2025-03-25 LAB
B-HCG UR QL: NEGATIVE
BILIRUB BLD-MCNC: NEGATIVE MG/DL
CLARITY, POC: ABNORMAL
COLOR UR: YELLOW
EXPIRATION DATE: ABNORMAL
EXPIRATION DATE: NORMAL
GLUCOSE UR STRIP-MCNC: NEGATIVE MG/DL
INTERNAL NEGATIVE CONTROL: NORMAL
INTERNAL POSITIVE CONTROL: NORMAL
KETONES UR QL: NEGATIVE
LEUKOCYTE EST, POC: ABNORMAL
Lab: ABNORMAL
Lab: NORMAL
NITRITE UR-MCNC: NEGATIVE MG/ML
PH UR: 6.5 [PH] (ref 5–8)
PROT UR STRIP-MCNC: ABNORMAL MG/DL
RBC # UR STRIP: ABNORMAL /UL
SP GR UR: 1.02 (ref 1–1.03)
UROBILINOGEN UR QL: ABNORMAL

## 2025-03-25 PROCEDURE — 81003 URINALYSIS AUTO W/O SCOPE: CPT

## 2025-03-25 PROCEDURE — 99214 OFFICE O/P EST MOD 30 MIN: CPT

## 2025-03-25 PROCEDURE — 81025 URINE PREGNANCY TEST: CPT

## 2025-03-25 RX ORDER — VALACYCLOVIR HYDROCHLORIDE 1 G/1
1000 TABLET, FILM COATED ORAL 2 TIMES DAILY
Qty: 20 TABLET | Refills: 0 | Status: SHIPPED | OUTPATIENT
Start: 2025-03-25 | End: 2025-04-04

## 2025-03-25 RX ORDER — NITROFURANTOIN 25; 75 MG/1; MG/1
100 CAPSULE ORAL 2 TIMES DAILY
Qty: 10 CAPSULE | Refills: 0 | Status: SHIPPED | OUTPATIENT
Start: 2025-03-25 | End: 2025-03-30

## 2025-03-25 NOTE — PROGRESS NOTES
Chief Complaint  Mass (Painful on vagina), Urinary Frequency, Burning with urination, and Amenorrhea (2 weeks)    Subjective          Urinary Frequency   Associated symptoms include frequency. Pertinent negatives include no chills, flank pain, hematuria, nausea or vomiting.   Difficulty Urinating   Associated symptoms include frequency. Pertinent negatives include no chills, flank pain, hematuria, nausea or vomiting.   Amenorrhea  Symptoms include dysuria.    Pertinent negative symptoms include no abdominal pain, no chest pain, no chills, no fever, no nausea and no vomiting.          Nazia Moore 24 y.o.female reports dysuria, urgency, and frequency. She has had symptoms for 2 days. The symptoms are moderate. Patient denies fever, flank pain, gross blood in urine, nausea, vomiting, abdominal pain, and risk of STD. Patient has tried  increasing fluids for relief of symptoms. Patient does not have a history of recurrent UTI. Patient does not have a history of pyelonephritis.    Ms. Moore also reports a vaginal mass. She reports associated pain and white vaginal discharge. She denies a history of STI. She is in a monogamous relationship with a male. She denies STI exposure.     She denies vaginal bleeding, swelling, fever, and pelvic pain.         Review of Systems   Constitutional:  Negative for chills and fever.   Respiratory:  Negative for chest tightness and shortness of breath.    Cardiovascular:  Negative for chest pain and palpitations.   Gastrointestinal:  Negative for abdominal pain, nausea and vomiting.   Genitourinary:  Positive for amenorrhea, dysuria, frequency, genital sores and menstrual problem. Negative for decreased urine volume, flank pain, hematuria and pelvic pain.   Musculoskeletal:  Negative for back pain.   Neurological:  Negative for dizziness and light-headedness.        Objective   Vital Signs:   /72 (BP Location: Left arm, Patient Position: Sitting, Cuff Size: Large Adult)   Pulse  "84   Temp 98.6 °F (37 °C) (Oral)   Ht 160 cm (63\")   Wt 93.4 kg (205 lb 12.8 oz)   SpO2 98%   BMI 36.46 kg/m²        Physical Exam  Vitals and nursing note reviewed.   Constitutional:       General: She is not in acute distress.     Appearance: She is well-developed. She is not ill-appearing or diaphoretic.   HENT:      Head: Normocephalic and atraumatic.   Eyes:      Conjunctiva/sclera: Conjunctivae normal.   Cardiovascular:      Rate and Rhythm: Normal rate.   Pulmonary:      Effort: Pulmonary effort is normal. No respiratory distress.      Breath sounds: Normal breath sounds.   Abdominal:      General: Bowel sounds are normal. There is no distension.      Palpations: Abdomen is soft. There is no mass.      Tenderness: There is no abdominal tenderness. There is no right CVA tenderness, left CVA tenderness, guarding or rebound.      Comments: No abdominal tenderness with palpation. Negative bilateral CVA tenderness. Abdomen palpates soft. No guarding or rebound tenderness.   Genitourinary:     Exam position: Lithotomy position.      Labia:         Right: Tenderness and lesion present. No injury.         Left: Tenderness and lesion present. No injury.       Urethra: No prolapse.      Vagina: Vaginal discharge (thin, white) present. No erythema, tenderness, bleeding, lesions or prolapsed vaginal walls.      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 No erythema, swelling, discharge from lesions, or bleeding.  Musculoskeletal:         General: Normal range of motion.      Cervical back: Neck supple.   Skin:     General: Skin is warm and dry.      Findings: No rash.   Neurological:      Mental Status: She is alert and oriented to person, place, and time.      Deep Tendon Reflexes: Reflexes are normal and symmetric.   Psychiatric:         Mood and Affect: Mood normal.                  The following data was reviewed by: " TIA Oshea on 03/25/2025:  POCT urinalysis dipstick, automated (03/25/2025 16:27)   POCT pregnancy, urine (03/25/2025 16:32)     Results                 Assessment and Plan                 Urinary tract infection with hematuria, site unspecified  -The patient was instructed to take medication as prescribed and until completed.  -Will send urine specimen for urine culture.    -Increase daily fluid intake to 2 to 3 L/day, restrict high oxalate foods such as beans/spinach/beets/potato chips/french fries/nuts/tea.  -Maintain proper urinary hygiene.  -Seek immediate medical attention for severe pain in back or lower abdomen, fever, nausea and vomiting, chills, or any other worsening signs or symptoms.  -Return to the office if symptoms worsen or fail to improve.    Orders:    Urine Culture - Urine, Urine, Clean Catch    nitrofurantoin, macrocrystal-monohydrate, (Macrobid) 100 MG capsule; Take 1 capsule by mouth 2 (Two) Times a Day for 5 days.    Frequent urination    Orders:    POCT urinalysis dipstick, automated    Urine Culture - Urine, Urine, Clean Catch    nitrofurantoin, macrocrystal-monohydrate, (Macrobid) 100 MG capsule; Take 1 capsule by mouth 2 (Two) Times a Day for 5 days.    NuSwab Vaginitis (VG) - Swab, Vagina    Chlamydia trachomatis, Neisseria gonorrhoeae, Trichomonas vaginalis, HSV 1/2, PCR - Vaginal Fluid, Vagina    Burning with urination    Orders:    POCT urinalysis dipstick, automated    Urine Culture - Urine, Urine, Clean Catch    nitrofurantoin, macrocrystal-monohydrate, (Macrobid) 100 MG capsule; Take 1 capsule by mouth 2 (Two) Times a Day for 5 days.    NuSwab Vaginitis (VG) - Swab, Vagina    Chlamydia trachomatis, Neisseria gonorrhoeae, Trichomonas vaginalis, HSV 1/2, PCR - Vaginal Fluid, Vagina    Pain with urination    Orders:    POCT urinalysis dipstick, automated    Urine Culture - Urine, Urine, Clean Catch    nitrofurantoin, macrocrystal-monohydrate, (Macrobid) 100 MG capsule; Take 1  capsule by mouth 2 (Two) Times a Day for 5 days.    NuSwab Vaginitis (VG) - Swab, Vagina    Chlamydia trachomatis, Neisseria gonorrhoeae, Trichomonas vaginalis, HSV 1/2, PCR - Vaginal Fluid, Vagina    Dysuria    Orders:    Urine Culture - Urine, Urine, Clean Catch    nitrofurantoin, macrocrystal-monohydrate, (Macrobid) 100 MG capsule; Take 1 capsule by mouth 2 (Two) Times a Day for 5 days.    NuSwab Vaginitis (VG) - Swab, Vagina    Chlamydia trachomatis, Neisseria gonorrhoeae, Trichomonas vaginalis, HSV 1/2, PCR - Vaginal Fluid, Vagina    Missed menses  Urine pregnancy test is negative today.  STD testing per vaginal swabs.    Orders:    POCT pregnancy, urine    Urine Culture - Urine, Urine, Clean Catch    NuSwab Vaginitis (VG) - Swab, Vagina    Chlamydia trachomatis, Neisseria gonorrhoeae, Trichomonas vaginalis, HSV 1/2, PCR - Vaginal Fluid, Vagina    Vaginal discharge  STD testing per vaginal swabs.  Maintain good perineal hygiene.  Return if no improvement.    Orders:    NuSwab Vaginitis (VG) - Swab, Vagina    Chlamydia trachomatis, Neisseria gonorrhoeae, Trichomonas vaginalis, HSV 1/2, PCR - Vaginal Fluid, Vagina    Genital lesion, female  Valacyclovir as directed.  STD testing per vaginal swabs.  Avoid sexual intercourse until treatment is completed and maintain safe sex.  Safe sexual practices were encouraged.    Orders:    valACYclovir (Valtrex) 1000 MG tablet; Take 1 tablet by mouth 2 (Two) Times a Day for 10 days.             Follow Up     Return if symptoms worsen or fail to improve.    Patient was given instructions and counseling regarding her condition or for health maintenance advice. Please see specific information pulled into the AVS if appropriate.

## 2025-03-25 NOTE — TELEPHONE ENCOUNTER
Caller: Nazia Moore    Relationship: Self    Best call back number: [phone]     What orders are you requesting (i.e. lab or imaging): LABS    In what timeframe would the patient need to come in: TODAY    Where will you receive your lab/imaging services: IN OFFICE    Additional notes: PATIENT HAS A 4:15 APPOINTMENT TODAY, BUT IS WANTING TO SEE IF THEY CAN GET A BLOOD DRAWN PREGNANCY TEST. SHE IS AFRAID THE LAB WILL CLOSE BEFORE SHE GETS IN WITH HER PCP TODAY. PLEASE CALL PATIENT IF ORDERS ARE POSTED SO THEY CAN COME IN A LITTLE EARLY FOR THEIR APPOINTMENT

## 2025-03-27 RX ORDER — ALBUTEROL SULFATE 90 UG/1
2 INHALANT RESPIRATORY (INHALATION) EVERY 4 HOURS PRN
Qty: 8 G | Refills: 3 | Status: SHIPPED | OUTPATIENT
Start: 2025-03-27

## 2025-03-28 ENCOUNTER — TELEPHONE (OUTPATIENT)
Dept: FAMILY MEDICINE CLINIC | Facility: CLINIC | Age: 25
End: 2025-03-28
Payer: COMMERCIAL

## 2025-03-28 DIAGNOSIS — N94.9 GENITAL LESION, FEMALE: ICD-10-CM

## 2025-03-28 LAB
A VAGINAE DNA VAG QL NAA+PROBE: ABNORMAL SCORE
BACTERIA UR CULT: NORMAL
BACTERIA UR CULT: NORMAL
BVAB2 DNA VAG QL NAA+PROBE: ABNORMAL SCORE
C ALBICANS DNA VAG QL NAA+PROBE: POSITIVE
C GLABRATA DNA VAG QL NAA+PROBE: NEGATIVE
C TRACH RRNA SPEC QL NAA+PROBE: NEGATIVE
HSV1 DNA SPEC QL NAA+PROBE: POSITIVE
HSV2 DNA SPEC QL NAA+PROBE: NEGATIVE
MEGA1 DNA VAG QL NAA+PROBE: ABNORMAL SCORE
N GONORRHOEA RRNA SPEC QL NAA+PROBE: NEGATIVE
T VAGINALIS DNA VAG QL NAA+PROBE: NEGATIVE
T VAGINALIS RRNA SPEC QL NAA+PROBE: NEGATIVE

## 2025-03-28 NOTE — TELEPHONE ENCOUNTER
Caller: FROY LIU    Relationship: Mother    Best call back number: [phone]    What test was performed: SWAB TEST    When was the test performed: 03-    Where was the test performed: IN OFFICE    Additional notes: PATIENTS MOTHER STATED THE PATIENT IS STILL EXPERIENCING SYMPTOMS, AND IS CONCERNED WHAT THIS RESULTS MAY BE.    PATIENTS MOTHER STATED THEY HAVE NOT RECEIVED RESULTS.    PLEASE CALL PATIENT TO DISCUSS AND ADVISE.

## 2025-03-30 DIAGNOSIS — B37.9 CANDIDA ALBICANS INFECTION: Primary | ICD-10-CM

## 2025-03-30 RX ORDER — FLUCONAZOLE 150 MG/1
150 TABLET ORAL ONCE
Qty: 2 TABLET | Refills: 0 | Status: SHIPPED | OUTPATIENT
Start: 2025-03-30 | End: 2025-03-30

## 2025-03-31 RX ORDER — VALACYCLOVIR HYDROCHLORIDE 1 G/1
1000 TABLET, FILM COATED ORAL 2 TIMES DAILY
Qty: 20 TABLET | Refills: 0 | OUTPATIENT
Start: 2025-03-31 | End: 2025-04-10

## 2025-03-31 NOTE — TELEPHONE ENCOUNTER
HUB TO RELAY  Left patient  a voicemail that her results were sent via [employer] for review. Per her provider Your testing is positive for yeast, negative for bacterial vaginosis, and positive for HSV1. Chlamydia, gonorrhea, and trichomonas are all negative. I will send Diflucan to your pharmacy. Take one tablet now and one tablet in 3 days if you are still having symptoms. Maintain good perineal hygiene. Continue Valtrex as directed. I encourage you to always maintain safe sexual practices. Your urine culture is within normal limits. Patient was instructed to contact our office for further question or concerns

## 2025-04-05 DIAGNOSIS — B37.9 CANDIDA ALBICANS INFECTION: ICD-10-CM

## 2025-04-07 RX ORDER — FLUCONAZOLE 150 MG/1
150 TABLET ORAL ONCE
Qty: 2 TABLET | Refills: 0 | OUTPATIENT
Start: 2025-04-07 | End: 2025-04-07

## 2025-04-12 DIAGNOSIS — N94.9 GENITAL LESION, FEMALE: ICD-10-CM

## 2025-04-14 NOTE — TELEPHONE ENCOUNTER
Rx Refill Note  Requested Prescriptions     Pending Prescriptions Disp Refills    valACYclovir (VALTREX) 1000 MG tablet [Pharmacy Med Name: valACYclovir HCl Oral Tablet 1 GM] 20 tablet 0     Sig: TAKE 1 TABLET BY MOUTH 2 TIMES A DAY FOR 10 DAYS      Last office visit with prescribing clinician: 3/25/2025   Last telemedicine visit with prescribing clinician: Visit date not found   Next office visit with prescribing clinician: Visit date not found                         Would you like a call back once the refill request has been completed: [] Yes [] No    If the office needs to give you a call back, can they leave a voicemail: [] Yes [] No    Nori Quinones MA  04/14/25, 11:43 EDT

## 2025-04-15 RX ORDER — VALACYCLOVIR HYDROCHLORIDE 1 G/1
1000 TABLET, FILM COATED ORAL 2 TIMES DAILY
Qty: 20 TABLET | Refills: 0 | OUTPATIENT
Start: 2025-04-15 | End: 2025-04-25

## 2025-04-20 DIAGNOSIS — N94.9 GENITAL LESION, FEMALE: ICD-10-CM

## 2025-04-21 RX ORDER — VALACYCLOVIR HYDROCHLORIDE 1 G/1
1000 TABLET, FILM COATED ORAL 2 TIMES DAILY
Qty: 20 TABLET | Refills: 0 | OUTPATIENT
Start: 2025-04-21 | End: 2025-05-01

## 2025-05-04 DIAGNOSIS — F41.9 ANXIETY: ICD-10-CM

## 2025-05-04 DIAGNOSIS — F32.A DEPRESSION, UNSPECIFIED DEPRESSION TYPE: ICD-10-CM

## 2025-05-05 NOTE — TELEPHONE ENCOUNTER
Rx Refill Note  Requested Prescriptions     Pending Prescriptions Disp Refills    sertraline (ZOLOFT) 100 MG tablet [Pharmacy Med Name: Sertraline HCl Oral Tablet 100 MG] 60 tablet 0     Sig: TAKE 1 TABLET BY MOUTH EVERY DAY      Last office visit with prescribing clinician: 3/25/2025   Last telemedicine visit with prescribing clinician: Visit date not found   Next office visit with prescribing clinician: Visit date not found        Follow Up     Return in about 6 weeks (around 4/15/2025) for Next scheduled follow up.   Ambulatory Referral to Behavioral Health

## 2025-05-06 RX ORDER — SERTRALINE HYDROCHLORIDE 100 MG/1
100 TABLET, FILM COATED ORAL DAILY
Qty: 60 TABLET | Refills: 0 | Status: SHIPPED | OUTPATIENT
Start: 2025-05-06

## 2025-06-01 DIAGNOSIS — Z30.41 ENCOUNTER FOR BIRTH CONTROL PILLS MAINTENANCE: ICD-10-CM

## 2025-06-02 RX ORDER — TIMOLOL MALEATE 5 MG/ML
1 SOLUTION/ DROPS OPHTHALMIC DAILY
Qty: 28 TABLET | Refills: 11 | Status: SHIPPED | OUTPATIENT
Start: 2025-06-02

## 2025-06-02 NOTE — TELEPHONE ENCOUNTER
Rx Refill Note  Requested Prescriptions     Pending Prescriptions Disp Refills    Vienva 0.1-20 MG-MCG per tablet [Pharmacy Med Name: Vienva Oral Tablet 0.1-20 MG-MCG] 28 tablet 0     Sig: TAKE 1 TABLET BY MOUTH EVERY DAY      Last office visit with prescribing clinician: 3/25/2025   Last telemedicine visit with prescribing clinician: Visit date not found   Next office visit with prescribing clinician: Visit date not found     Oral Contraceptives Protocol [id number]/2025 01:07 PM    Up to date with pap smear Health Maintenance          GIL[facility] Quick(R)  06/02/25, 12:04 EDT